# Patient Record
Sex: FEMALE | Race: BLACK OR AFRICAN AMERICAN | NOT HISPANIC OR LATINO
[De-identification: names, ages, dates, MRNs, and addresses within clinical notes are randomized per-mention and may not be internally consistent; named-entity substitution may affect disease eponyms.]

---

## 2018-08-22 PROBLEM — Z00.00 ENCOUNTER FOR PREVENTIVE HEALTH EXAMINATION: Status: ACTIVE | Noted: 2018-08-22

## 2018-12-27 ENCOUNTER — APPOINTMENT (OUTPATIENT)
Dept: GASTROENTEROLOGY | Facility: HOSPITAL | Age: 58
End: 2018-12-27

## 2019-01-24 ENCOUNTER — OTHER (OUTPATIENT)
Age: 59
End: 2019-01-24

## 2019-01-24 ENCOUNTER — LABORATORY RESULT (OUTPATIENT)
Age: 59
End: 2019-01-24

## 2019-01-24 ENCOUNTER — APPOINTMENT (OUTPATIENT)
Dept: GASTROENTEROLOGY | Facility: HOSPITAL | Age: 59
End: 2019-01-24
Payer: MEDICAID

## 2019-01-24 ENCOUNTER — OUTPATIENT (OUTPATIENT)
Dept: OUTPATIENT SERVICES | Facility: HOSPITAL | Age: 59
LOS: 1 days | End: 2019-01-24

## 2019-01-24 VITALS
BODY MASS INDEX: 52.17 KG/M2 | WEIGHT: 225.44 LBS | HEART RATE: 95 BPM | HEIGHT: 55 IN | SYSTOLIC BLOOD PRESSURE: 153 MMHG | DIASTOLIC BLOOD PRESSURE: 101 MMHG

## 2019-01-24 DIAGNOSIS — Z86.39 PERSONAL HISTORY OF OTHER ENDOCRINE, NUTRITIONAL AND METABOLIC DISEASE: ICD-10-CM

## 2019-01-24 DIAGNOSIS — Z12.11 ENCOUNTER FOR SCREENING FOR MALIGNANT NEOPLASM OF COLON: ICD-10-CM

## 2019-01-24 DIAGNOSIS — Z86.79 PERSONAL HISTORY OF OTHER DISEASES OF THE CIRCULATORY SYSTEM: ICD-10-CM

## 2019-01-24 DIAGNOSIS — Z78.9 OTHER SPECIFIED HEALTH STATUS: ICD-10-CM

## 2019-01-24 LAB
ALBUMIN SERPL ELPH-MCNC: 4.8 G/DL — SIGNIFICANT CHANGE UP (ref 3.3–5)
ALP SERPL-CCNC: 98 U/L — SIGNIFICANT CHANGE UP (ref 40–120)
ALT FLD-CCNC: 22 U/L — SIGNIFICANT CHANGE UP (ref 4–33)
ANION GAP SERPL CALC-SCNC: 15 MMO/L — HIGH (ref 7–14)
AST SERPL-CCNC: 22 U/L — SIGNIFICANT CHANGE UP (ref 4–32)
BASOPHILS # BLD AUTO: 0.04 K/UL — SIGNIFICANT CHANGE UP (ref 0–0.2)
BASOPHILS NFR BLD AUTO: 0.6 % — SIGNIFICANT CHANGE UP (ref 0–2)
BILIRUB SERPL-MCNC: 0.3 MG/DL — SIGNIFICANT CHANGE UP (ref 0.2–1.2)
BUN SERPL-MCNC: 16 MG/DL — SIGNIFICANT CHANGE UP (ref 7–23)
CALCIUM SERPL-MCNC: 10.3 MG/DL — SIGNIFICANT CHANGE UP (ref 8.4–10.5)
CHLORIDE SERPL-SCNC: 100 MMOL/L — SIGNIFICANT CHANGE UP (ref 98–107)
CO2 SERPL-SCNC: 26 MMOL/L — SIGNIFICANT CHANGE UP (ref 22–31)
CREAT SERPL-MCNC: 0.78 MG/DL — SIGNIFICANT CHANGE UP (ref 0.5–1.3)
EOSINOPHIL # BLD AUTO: 0.09 K/UL — SIGNIFICANT CHANGE UP (ref 0–0.5)
EOSINOPHIL NFR BLD AUTO: 1.3 % — SIGNIFICANT CHANGE UP (ref 0–6)
GLUCOSE SERPL-MCNC: 87 MG/DL — SIGNIFICANT CHANGE UP (ref 70–99)
HBV CORE AB SER-ACNC: NONREACTIVE — SIGNIFICANT CHANGE UP
HBV SURFACE AB SER-ACNC: NONREACTIVE — SIGNIFICANT CHANGE UP
HBV SURFACE AG SER-ACNC: NONREACTIVE — SIGNIFICANT CHANGE UP
HCT VFR BLD CALC: 42.2 % — SIGNIFICANT CHANGE UP (ref 34.5–45)
HGB BLD-MCNC: 13.2 G/DL — SIGNIFICANT CHANGE UP (ref 11.5–15.5)
IMM GRANULOCYTES NFR BLD AUTO: 0.7 % — SIGNIFICANT CHANGE UP (ref 0–1.5)
LYMPHOCYTES # BLD AUTO: 1.86 K/UL — SIGNIFICANT CHANGE UP (ref 1–3.3)
LYMPHOCYTES # BLD AUTO: 27.8 % — SIGNIFICANT CHANGE UP (ref 13–44)
MCHC RBC-ENTMCNC: 25.8 PG — LOW (ref 27–34)
MCHC RBC-ENTMCNC: 31.3 % — LOW (ref 32–36)
MCV RBC AUTO: 82.6 FL — SIGNIFICANT CHANGE UP (ref 80–100)
MONOCYTES # BLD AUTO: 0.63 K/UL — SIGNIFICANT CHANGE UP (ref 0–0.9)
MONOCYTES NFR BLD AUTO: 9.4 % — SIGNIFICANT CHANGE UP (ref 2–14)
NEUTROPHILS # BLD AUTO: 4.03 K/UL — SIGNIFICANT CHANGE UP (ref 1.8–7.4)
NEUTROPHILS NFR BLD AUTO: 60.2 % — SIGNIFICANT CHANGE UP (ref 43–77)
NRBC # FLD: 0 K/UL — LOW (ref 25–125)
PLATELET # BLD AUTO: 202 K/UL — SIGNIFICANT CHANGE UP (ref 150–400)
PMV BLD: 11 FL — SIGNIFICANT CHANGE UP (ref 7–13)
POTASSIUM SERPL-MCNC: 3.2 MMOL/L — LOW (ref 3.5–5.3)
POTASSIUM SERPL-SCNC: 3.2 MMOL/L — LOW (ref 3.5–5.3)
PROT SERPL-MCNC: 8.2 G/DL — SIGNIFICANT CHANGE UP (ref 6–8.3)
RBC # BLD: 5.11 M/UL — SIGNIFICANT CHANGE UP (ref 3.8–5.2)
RBC # FLD: 13.2 % — SIGNIFICANT CHANGE UP (ref 10.3–14.5)
SODIUM SERPL-SCNC: 141 MMOL/L — SIGNIFICANT CHANGE UP (ref 135–145)
WBC # BLD: 6.7 K/UL — SIGNIFICANT CHANGE UP (ref 3.8–10.5)
WBC # FLD AUTO: 6.7 K/UL — SIGNIFICANT CHANGE UP (ref 3.8–10.5)

## 2019-01-24 PROCEDURE — 99203 OFFICE O/P NEW LOW 30 MIN: CPT | Mod: GC

## 2019-01-24 RX ORDER — METFORMIN HYDROCHLORIDE 1000 MG/1
1000 TABLET, COATED ORAL
Refills: 0 | Status: ACTIVE | COMMUNITY

## 2019-01-24 RX ORDER — POLYETHYLENE GLYCOL 3350 AND ELECTROLYTES WITH LEMON FLAVOR 236; 22.74; 6.74; 5.86; 2.97 G/4L; G/4L; G/4L; G/4L; G/4L
236 POWDER, FOR SOLUTION ORAL
Qty: 1 | Refills: 0 | Status: ACTIVE | COMMUNITY
Start: 2019-01-24 | End: 1900-01-01

## 2019-01-24 RX ORDER — ARIPIPRAZOLE 20 MG/1
20 TABLET ORAL DAILY
Refills: 0 | Status: ACTIVE | COMMUNITY

## 2019-01-24 RX ORDER — SIMVASTATIN 40 MG/1
40 TABLET, FILM COATED ORAL
Refills: 0 | Status: ACTIVE | COMMUNITY

## 2019-01-24 RX ORDER — ENALAPRIL MALEATE 20 MG/1
20 TABLET ORAL TWICE DAILY
Refills: 0 | Status: ACTIVE | COMMUNITY

## 2019-01-24 RX ORDER — SERTRALINE HYDROCHLORIDE 100 MG/1
100 TABLET, FILM COATED ORAL DAILY
Refills: 0 | Status: ACTIVE | COMMUNITY

## 2019-01-24 RX ORDER — HYDROCHLOROTHIAZIDE 25 MG/1
25 TABLET ORAL DAILY
Refills: 0 | Status: ACTIVE | COMMUNITY

## 2019-01-24 RX ORDER — ASPIRIN 81 MG
81 TABLET, DELAYED RELEASE (ENTERIC COATED) ORAL
Refills: 0 | Status: ACTIVE | COMMUNITY

## 2019-01-24 NOTE — PHYSICAL EXAM
[General Appearance - Alert] : alert [General Appearance - In No Acute Distress] : in no acute distress [Sclera] : the sclera and conjunctiva were normal [Extraocular Movements] : extraocular movements were intact [Outer Ear] : the ears and nose were normal in appearance [Hearing Threshold Finger Rub Not [place]] : hearing was normal [Neck Appearance] : the appearance of the neck was normal [] : no respiratory distress [Respiration, Rhythm And Depth] : normal respiratory rhythm and effort [Heart Rate And Rhythm] : heart rate was normal and rhythm regular [Heart Sounds] : normal S1 and S2 [Arterial Pulses Carotid] : carotid pulses were normal with no bruits [Abdominal Aorta] : the abdominal aorta was normal [Bowel Sounds] : normal bowel sounds [Abdomen Soft] : soft [Abdomen Tenderness] : non-tender [Cervical Lymph Nodes Enlarged Posterior Bilaterally] : posterior cervical [Cervical Lymph Nodes Enlarged Anterior Bilaterally] : anterior cervical [No CVA Tenderness] : no ~M costovertebral angle tenderness [No Spinal Tenderness] : no spinal tenderness [Abnormal Walk] : normal gait [Involuntary Movements] : no involuntary movements were seen [Skin Color & Pigmentation] : normal skin color and pigmentation [Skin Turgor] : normal skin turgor [Cranial Nerves] : cranial nerves 2-12 were intact [No Focal Deficits] : no focal deficits [Oriented To Time, Place, And Person] : oriented to person, place, and time [Mood] : the mood was normal

## 2019-01-24 NOTE — END OF VISIT
[] : Fellow [FreeTextEntry3] : As modified and discussed with patient\par MD MERCY Ramirez [facility]\par Associate Professor of Medicine\par [facility] [employer]\par

## 2019-01-24 NOTE — HISTORY OF PRESENT ILLNESS
[de-identified] : This is a 58 year old woman with hypertension, hyperlipidemia, non-insulin dependent diabetes mellitus who is referred to GI clinic for colorectal cancer screening.\par \par She is accompanied by her home health aid with whom she lives. She feels well and denies abdominal pain, nausea, vomiting, fever, chills, melena, hematochezia, hematemesis, dysphagia, odynophagia, weight loss. She moves her bowels at least once per day. \par \par She takes aspirin 81 mg PO daily. She has never had a colonoscopy or EGD. She was born in [place] She does not know of any family history of colon cancer. She denies alcohol, tobacco, drug use. \par

## 2019-01-25 DIAGNOSIS — Z12.11 ENCOUNTER FOR SCREENING FOR MALIGNANT NEOPLASM OF COLON: ICD-10-CM

## 2019-01-25 DIAGNOSIS — E66.9 OBESITY, UNSPECIFIED: ICD-10-CM

## 2019-03-13 ENCOUNTER — OUTPATIENT (OUTPATIENT)
Dept: OUTPATIENT SERVICES | Facility: HOSPITAL | Age: 59
LOS: 1 days | End: 2019-03-13
Payer: MEDICAID

## 2019-03-13 VITALS
WEIGHT: 222.01 LBS | DIASTOLIC BLOOD PRESSURE: 80 MMHG | TEMPERATURE: 100 F | SYSTOLIC BLOOD PRESSURE: 140 MMHG | HEIGHT: 64 IN | OXYGEN SATURATION: 94 % | HEART RATE: 82 BPM | RESPIRATION RATE: 14 BRPM

## 2019-03-13 DIAGNOSIS — R58 HEMORRHAGE, NOT ELSEWHERE CLASSIFIED: Chronic | ICD-10-CM

## 2019-03-13 DIAGNOSIS — Z12.11 ENCOUNTER FOR SCREENING FOR MALIGNANT NEOPLASM OF COLON: ICD-10-CM

## 2019-03-13 DIAGNOSIS — E11.9 TYPE 2 DIABETES MELLITUS WITHOUT COMPLICATIONS: ICD-10-CM

## 2019-03-13 DIAGNOSIS — R06.83 SNORING: ICD-10-CM

## 2019-03-13 DIAGNOSIS — R52 PAIN, UNSPECIFIED: ICD-10-CM

## 2019-03-13 LAB
ANION GAP SERPL CALC-SCNC: 13 MMO/L — SIGNIFICANT CHANGE UP (ref 7–14)
BUN SERPL-MCNC: 16 MG/DL — SIGNIFICANT CHANGE UP (ref 7–23)
CALCIUM SERPL-MCNC: 10.4 MG/DL — SIGNIFICANT CHANGE UP (ref 8.4–10.5)
CHLORIDE SERPL-SCNC: 102 MMOL/L — SIGNIFICANT CHANGE UP (ref 98–107)
CO2 SERPL-SCNC: 28 MMOL/L — SIGNIFICANT CHANGE UP (ref 22–31)
CREAT SERPL-MCNC: 0.77 MG/DL — SIGNIFICANT CHANGE UP (ref 0.5–1.3)
GLUCOSE SERPL-MCNC: 86 MG/DL — SIGNIFICANT CHANGE UP (ref 70–99)
HBA1C BLD-MCNC: 6 % — HIGH (ref 4–5.6)
HCT VFR BLD CALC: 41.1 % — SIGNIFICANT CHANGE UP (ref 34.5–45)
HGB BLD-MCNC: 12.5 G/DL — SIGNIFICANT CHANGE UP (ref 11.5–15.5)
MCHC RBC-ENTMCNC: 25.6 PG — LOW (ref 27–34)
MCHC RBC-ENTMCNC: 30.4 % — LOW (ref 32–36)
MCV RBC AUTO: 84 FL — SIGNIFICANT CHANGE UP (ref 80–100)
NRBC # FLD: 0 K/UL — LOW (ref 25–125)
PLATELET # BLD AUTO: 208 K/UL — SIGNIFICANT CHANGE UP (ref 150–400)
PMV BLD: 10.9 FL — SIGNIFICANT CHANGE UP (ref 7–13)
POTASSIUM SERPL-MCNC: 3.2 MMOL/L — LOW (ref 3.5–5.3)
POTASSIUM SERPL-SCNC: 3.2 MMOL/L — LOW (ref 3.5–5.3)
RBC # BLD: 4.89 M/UL — SIGNIFICANT CHANGE UP (ref 3.8–5.2)
RBC # FLD: 13.8 % — SIGNIFICANT CHANGE UP (ref 10.3–14.5)
SODIUM SERPL-SCNC: 143 MMOL/L — SIGNIFICANT CHANGE UP (ref 135–145)
WBC # BLD: 6.36 K/UL — SIGNIFICANT CHANGE UP (ref 3.8–10.5)
WBC # FLD AUTO: 6.36 K/UL — SIGNIFICANT CHANGE UP (ref 3.8–10.5)

## 2019-03-13 PROCEDURE — 93010 ELECTROCARDIOGRAM REPORT: CPT

## 2019-03-13 NOTE — H&P PST ADULT - NSICDXPASTSURGICALHX_GEN_ALL_CORE_FT
PAST SURGICAL HISTORY:  Bleeding hysterectomy in appoximately 2014 PAST SURGICAL HISTORY:  Bleeding hysterectomy in approximately  2014

## 2019-03-13 NOTE — H&P PST ADULT - HISTORY OF PRESENT ILLNESS
This is a 59 y/o female who presents with need for routine colonoscopy. Scheduled for anesthesia - colonoscopy on 3-27-19

## 2019-03-13 NOTE — H&P PST ADULT - NSANTHOSAYNRD_GEN_A_CORE
No. JAIRO screening performed.  STOP BANG Legend: 0-2 = LOW Risk; 3-4 = INTERMEDIATE Risk; 5-8 = HIGH Risk

## 2019-03-13 NOTE — H&P PST ADULT - NSICDXPASTMEDICALHX_GEN_ALL_CORE_FT
PAST MEDICAL HISTORY:  Depression     DM (Diabetes Mellitus) type 2; diagnosed in 2014    History of hallucinations has not had hallucinations since 2000    Hyperlipidemia     Hypertension     Snoring JAIRO precautions -- responds affirmatively to STOP BANG questionnaire

## 2019-03-13 NOTE — H&P PST ADULT - NSICDXPROBLEM_GEN_ALL_CORE_FT
PROBLEM DIAGNOSES  Problem: Diabetes  Assessment and Plan: Instructed to stop Metformin 24 hours before surgery  * Last aspirin on 3-19-19  * Instructed to take normal am dose of hydrochlorothiazide and enalapril the am of surgery  * Await old ekg for comparison    Problem: Pain  Assessment and Plan: This is a 59 y/o female who is scheduled for anethesia - colonoscopy on 3-27-19  * Given pre op instructions PROBLEM DIAGNOSES  Problem: Diabetes  Assessment and Plan: Instructed to stop Metformin 24 hours before surgery  * Last aspirin on 3-19-19  * Instructed to take normal am dose of hydrochlorothiazide and enalapril the am of surgery  * Await old ekg for comparison    Problem: Pain  Assessment and Plan: This is a 57 y/o female who is scheduled for anesthesia - colonoscopy on 3-27-19  * Given pre op instructions PROBLEM DIAGNOSES  Problem: Snoring  Assessment and Plan: Notified OR booking via fax of JAIRO precautions and DM    Problem: Diabetes  Assessment and Plan: Instructed to stop Metformin 24 hours before surgery  * Last aspirin on 3-19-19  * Instructed to take normal am dose of hydrochlorothiazide and enalapril the am of surgery  * Await old ekg for comparison    Problem: Pain  Assessment and Plan: This is a 57 y/o female who is scheduled for anesthesia - colonoscopy on 3-27-19  * Given pre op instructions

## 2019-03-27 ENCOUNTER — RESULT REVIEW (OUTPATIENT)
Age: 59
End: 2019-03-27

## 2019-03-27 ENCOUNTER — OUTPATIENT (OUTPATIENT)
Dept: OUTPATIENT SERVICES | Facility: HOSPITAL | Age: 59
LOS: 1 days | Discharge: HOME | End: 2019-03-27
Payer: MEDICAID

## 2019-03-27 DIAGNOSIS — R58 HEMORRHAGE, NOT ELSEWHERE CLASSIFIED: Chronic | ICD-10-CM

## 2019-03-27 DIAGNOSIS — Z12.11 ENCOUNTER FOR SCREENING FOR MALIGNANT NEOPLASM OF COLON: ICD-10-CM

## 2019-03-27 LAB
GAS PNL BLDV: 139 MMOL/L — SIGNIFICANT CHANGE UP (ref 136–146)
GLUCOSE BLDC GLUCOMTR-MCNC: 76 MG/DL — SIGNIFICANT CHANGE UP (ref 70–99)
GLUCOSE BLDV-MCNC: 90 — SIGNIFICANT CHANGE UP (ref 70–99)
POTASSIUM BLDV-SCNC: 3.1 MMOL/L — LOW (ref 3.4–4.5)

## 2019-03-27 PROCEDURE — 45380 COLONOSCOPY AND BIOPSY: CPT | Mod: GC

## 2019-03-27 PROCEDURE — 88305 TISSUE EXAM BY PATHOLOGIST: CPT | Mod: 26

## 2019-03-29 PROBLEM — Z86.59 PERSONAL HISTORY OF OTHER MENTAL AND BEHAVIORAL DISORDERS: Chronic | Status: ACTIVE | Noted: 2019-03-13

## 2019-03-29 PROBLEM — R06.83 SNORING: Chronic | Status: ACTIVE | Noted: 2019-03-13

## 2019-03-29 LAB — SURGICAL PATHOLOGY STUDY: SIGNIFICANT CHANGE UP

## 2019-04-11 ENCOUNTER — OUTPATIENT (OUTPATIENT)
Dept: OUTPATIENT SERVICES | Facility: HOSPITAL | Age: 59
LOS: 1 days | End: 2019-04-11

## 2019-04-11 ENCOUNTER — APPOINTMENT (OUTPATIENT)
Dept: GASTROENTEROLOGY | Facility: HOSPITAL | Age: 59
End: 2019-04-11

## 2019-04-11 VITALS
WEIGHT: 223 LBS | BODY MASS INDEX: 38.07 KG/M2 | DIASTOLIC BLOOD PRESSURE: 85 MMHG | HEART RATE: 77 BPM | SYSTOLIC BLOOD PRESSURE: 137 MMHG | HEIGHT: 64 IN

## 2019-04-11 DIAGNOSIS — K63.5 POLYP OF COLON: ICD-10-CM

## 2019-04-11 DIAGNOSIS — E66.9 OBESITY, UNSPECIFIED: ICD-10-CM

## 2019-04-11 DIAGNOSIS — R58 HEMORRHAGE, NOT ELSEWHERE CLASSIFIED: Chronic | ICD-10-CM

## 2019-04-11 NOTE — PHYSICAL EXAM
[General Appearance - Well Nourished] : well nourished [Sclera] : the sclera and conjunctiva were normal [General Appearance - Well Developed] : well developed [Extraocular Movements] : extraocular movements were intact [PERRL With Normal Accommodation] : pupils were equal in size, round, and reactive to light [Outer Ear] : the ears and nose were normal in appearance [Hearing Threshold Finger Rub Not [place]] : hearing was normal [Both Tympanic Membranes Were Examined] : both tympanic membranes were normal [Neck Appearance] : the appearance of the neck was normal [Neck Cervical Mass (___cm)] : no neck mass was observed [Respiration, Rhythm And Depth] : normal respiratory rhythm and effort [Auscultation Breath Sounds / Voice Sounds] : lungs were clear to auscultation bilaterally [Heart Sounds] : normal S1 and S2 [Murmurs] : no murmurs [Heart Sounds Pericardial Friction Rub] : no pericardial rub [Bowel Sounds] : normal bowel sounds [Abdomen Tenderness] : non-tender [Cervical Lymph Nodes Enlarged Posterior Bilaterally] : posterior cervical [Nail Clubbing] : no clubbing  or cyanosis of the fingernails [Cervical Lymph Nodes Enlarged Anterior Bilaterally] : anterior cervical [] : no rash [Motor Tone] : muscle strength and tone were normal [Skin Lesions] : no skin lesions [Sensation] : the sensory exam was normal to light touch and pinprick [Motor Exam] : the motor exam was normal [Affect] : the affect was normal [Mood] : the mood was normal

## 2019-04-11 NOTE — ASSESSMENT
[FreeTextEntry1] : Impression:\par 1) 2 hyperplastic polyps- One in sigmoid (4mm) and one in rectum (5mm)\par 2) Obesity\par \par Recommendations:\par -Repeat colonoscopy in 10 years\par -Dietary strategies discussed with the patient including use of psyllium husk before breakfast and supper and Glucerna in place of lunch; mild exercise also discussed; weight once weekly; think of calory spending\par \par \par

## 2019-04-11 NOTE — HISTORY OF PRESENT ILLNESS
[de-identified] : 59yo F history of HTN, HLD, DM presents here to follow up on her screening colonoscopy.                            \par Colonoscopy report is as shown:\par                                                   \par Impression:          - Non-bleeding internal hemorrhoids.\par                      - One 4 mm, non-bleeding polyp in the rectum, removed \par                      with a jumbo cold forceps. Resected and retrieved.\par                      - One 5 mm, non-bleeding polyp in the sigmoid colon, \par                      removed with a jumbo cold forceps. Resected and \par                      retrieved.\par                      - Diverticulosis in the descending colon.\par                      - The descending colon, transverse colon, hepatic \par                      flexure, ascending colon, cecum, appendiceal orifice and \par                      ileocecal valve are normal.\par Recommendation:      - Discharge patient to home (with escort).\par                      - Resume previous diet today.\par                      - Await pathology results.\par                      - Repeat colonoscopy in 5 years for surveillance.\par                      - Return to GI office in 2 weeks.\par \par Path:\par 1. Sigmoid, polyp, biopsy\par - Hyperplastic polyp\par \par 2. Rectum, polyp, biopsy\par - Hyperplastic polyp\par \par The patient denies heartburn, dyspepsia, dysphagia, change in bowel habit, melena, weight loss, anemia or hematochezia, hematemesis. Pt denies family history of gallstones, colorectal cancer, polyps, breast, ovarian cancer. \par \par

## 2021-06-18 NOTE — H&P PST ADULT - NS PRO TALK SOMEONE YN
Update History & Physical 
 
The Patient's History and Physical of [place] 10,  
2021 was reviewed with the patient and I examined the patient. There was no change. The surgical site was confirmed by the patient and me. Plan:  The risk, benefits, expected outcome, and alternative to the recommended procedure have been discussed with the patient. Patient understands and wants to proceed with the procedure.  
 
Electronically signed by Ginger Juarez MD on 6/18/2021 at 12:04 PM 

no

## 2021-10-29 NOTE — H&P PST ADULT - RESPIRATORY RATE (BREATHS/MIN)
normal 14 Xolair Counseling:  Patient informed of potential adverse effects including but not limited to fever, muscle aches, rash and allergic reactions.  The patient verbalized understanding of the proper use and possible adverse effects of Xolair.  All of the patient's questions and concerns were addressed.

## 2024-08-08 ENCOUNTER — APPOINTMENT (OUTPATIENT)
Dept: GASTROENTEROLOGY | Facility: CLINIC | Age: 64
End: 2024-08-08

## 2024-08-08 ENCOUNTER — OUTPATIENT (OUTPATIENT)
Dept: OUTPATIENT SERVICES | Facility: HOSPITAL | Age: 64
LOS: 1 days | End: 2024-08-08

## 2024-08-08 DIAGNOSIS — R58 HEMORRHAGE, NOT ELSEWHERE CLASSIFIED: Chronic | ICD-10-CM

## 2024-08-08 PROCEDURE — 99203 OFFICE O/P NEW LOW 30 MIN: CPT

## 2024-08-08 NOTE — ASSESSMENT
[FreeTextEntry1] : 63-year-old female, PMH DM, HTN, HLD presenting for follow-up.   Last colonoscopy 2019, two small hyperplastic polyps. OK to repeat in 10 years. No changes in bowel habits, no alarm symptoms that would require sooner screening.  Repeat colonoscopy in 2029, counseled to  return sooner if anything changes.

## 2024-08-08 NOTE — END OF VISIT
[] : Fellow [FreeTextEntry3] : The patient had a previous colonoscopy and 2 small hyperplastic polyps were found in 2019. She reports normal bowels and there is no history of GI bleeding. The plan is to repeat the colonoscopy at the 10 year point. Told to return to clinic earlier if develops any GI complaints.

## 2024-08-08 NOTE — HISTORY OF PRESENT ILLNESS
[FreeTextEntry1] : 63 year old female, PMH DM, HLD, HTN, presenting for colon cancer screening.   Last colonoscopy in 2019 with internal hemorrhoids, one 4mm non-bleeding polyp in rectum, one 5mm polyp in sigmoid (both hyperplastic). Reccomended repeat in 10 years. Today, she is denying any complaints. No changes in bowel habits, goes regularly daily. No blood, no melena, no constipation. No abdominal pain. Lost a bit of weight over the past year, but intentional, has been eating healthier.

## 2024-08-16 DIAGNOSIS — K63.5 POLYP OF COLON: ICD-10-CM

## 2025-05-09 NOTE — H&P PST ADULT - LAST STRESS TEST
Patient called she started Amiodarone this morning  At 7:00 am she is feeling pressure in her temples   And is asking if  she  can cut it in half .   home health provider denies